# Patient Record
Sex: FEMALE | Race: BLACK OR AFRICAN AMERICAN | NOT HISPANIC OR LATINO | Employment: FULL TIME | URBAN - METROPOLITAN AREA
[De-identification: names, ages, dates, MRNs, and addresses within clinical notes are randomized per-mention and may not be internally consistent; named-entity substitution may affect disease eponyms.]

---

## 2023-09-20 ENCOUNTER — APPOINTMENT (OUTPATIENT)
Dept: PRIMARY CARE | Facility: CLINIC | Age: 58
End: 2023-09-20
Payer: COMMERCIAL

## 2023-09-20 NOTE — PROGRESS NOTES
Subjective   Patient ID: Meme Delgadillo is a 57 y.o. female h/o T2DM, ischemic stroke in 2022, obesity, HTN, tobacco use who presented with new onset RLE weakness and found to have acute L MCA infarct. who presents for No chief complaint on file..  HPI  Admitted to  recently from 8/31-9/6 for a perforated ulcer. She was at [facility] rehab after stroke, patient requested to be sent to  ED for low back pain and urinary retention  CT showed contained microperf of gastric antrum  Admitted, treated with IV abx, IV hydration, and bowel rest  Refused recommendation of SNF, discharged home with home care    Discharge Medications: Home Medication   metFORMIN 1000 mg oral tablet - 1 tab(s) orally 2 times a day   insulin lispro 100 units/mL subcutaneous solution - 1 dose(s) subcutaneous 4 times a day (before meals and at bedtime), per sliding scale   aspirin 81 mg oral tablet, chewable - 1 tab(s) orally once a day   losartan-hydrochlorothiazide 50 mg-12.5 mg oral tablet - 2 tab(s) orally once a day   clopidogrel 75 mg oral tablet - 1 tab(s) orally once a day   insulin glargine 100 units/mL subcutaneous solution - 35 unit(s) subcutaneous once a day (in the morning)   Trulicity Pen 0.75 mg/0.5 mL subcutaneous solution - 1 dose(s) subcutaneous once a week   amoxicillin-clavulanate 875 mg-125 mg oral tablet - 1 tab(s) orally 2 times a day   Walker with Wheels - 1 each   Height: 167.5cm, Weight 127 kg    pantoprazole 40 mg oral delayed release tablet - 1 tab(s) orally once a day        PRN Medication   hydrocortisone 1% rectal cream with applicator - 1 application rectal 2 times a day, As Needed   ammonium lactate 12% topical lotion - Apply topically to affected area 2 times a day, As Needed - for dry skin   acetaminophen 325 mg oral tablet - 2 tab(s) orally every 6 hours, As Needed - for mild pain       Objective   There were no vitals taken for this visit.   Physical Exam  General: Well appearing, conversational, in no acute  distress  HEENT: EOMI, PERRL, nares patent without congestion, MMM, TMs clear bilaterally   CV: RRR, no murmurs  Resp: Lungs CTAB, normal work of breathing  GI: Soft, nondistended, nontender, BS+   Ext: No lower ext swelling  Skin: Warm, dry, no rashes  Neuro: Awake, alert, oriented x3, moving all 4 extremities, nonfocal, normal gait, ambulates without assistance  Psych: Appropriate mood and affect      Assessment/Plan   Meme Delgadillo is a 57 y.o. female who presents for No chief complaint on file..  Problem List Items Addressed This Visit    None           Radha Cortez MD MPH

## 2023-09-26 ENCOUNTER — OFFICE VISIT (OUTPATIENT)
Dept: PRIMARY CARE | Facility: CLINIC | Age: 58
End: 2023-09-26
Payer: COMMERCIAL

## 2023-09-26 VITALS
SYSTOLIC BLOOD PRESSURE: 140 MMHG | OXYGEN SATURATION: 98 % | DIASTOLIC BLOOD PRESSURE: 86 MMHG | WEIGHT: 254 LBS | HEART RATE: 89 BPM

## 2023-09-26 DIAGNOSIS — E11.42 DIABETIC POLYNEUROPATHY ASSOCIATED WITH TYPE 2 DIABETES MELLITUS (MULTI): ICD-10-CM

## 2023-09-26 DIAGNOSIS — L85.3 DRY SKIN: Primary | ICD-10-CM

## 2023-09-26 DIAGNOSIS — I63.9 CEREBROVASCULAR ACCIDENT (CVA), UNSPECIFIED MECHANISM (MULTI): ICD-10-CM

## 2023-09-26 DIAGNOSIS — E11.8 DIABETES MELLITUS TYPE 2 WITH COMPLICATIONS (MULTI): ICD-10-CM

## 2023-09-26 DIAGNOSIS — I10 PRIMARY HYPERTENSION: ICD-10-CM

## 2023-09-26 PROCEDURE — 3079F DIAST BP 80-89 MM HG: CPT | Performed by: STUDENT IN AN ORGANIZED HEALTH CARE EDUCATION/TRAINING PROGRAM

## 2023-09-26 PROCEDURE — 1036F TOBACCO NON-USER: CPT | Performed by: STUDENT IN AN ORGANIZED HEALTH CARE EDUCATION/TRAINING PROGRAM

## 2023-09-26 PROCEDURE — 99204 OFFICE O/P NEW MOD 45 MIN: CPT | Performed by: STUDENT IN AN ORGANIZED HEALTH CARE EDUCATION/TRAINING PROGRAM

## 2023-09-26 PROCEDURE — 3077F SYST BP >= 140 MM HG: CPT | Performed by: STUDENT IN AN ORGANIZED HEALTH CARE EDUCATION/TRAINING PROGRAM

## 2023-09-26 RX ORDER — NAPROXEN SODIUM 220 MG/1
81 TABLET, FILM COATED ORAL
COMMUNITY
End: 2023-09-26 | Stop reason: SDUPTHER

## 2023-09-26 RX ORDER — PANTOPRAZOLE SODIUM 40 MG/1
40 TABLET, DELAYED RELEASE ORAL DAILY
Qty: 90 TABLET | Refills: 3 | Status: SHIPPED | OUTPATIENT
Start: 2023-09-26 | End: 2024-09-25

## 2023-09-26 RX ORDER — AMMONIUM LACTATE 12 G/100G
1 LOTION TOPICAL DAILY PRN
Qty: 225 G | Refills: 3 | Status: SHIPPED | OUTPATIENT
Start: 2023-09-26 | End: 2023-10-26

## 2023-09-26 RX ORDER — INSULIN GLARGINE 100 [IU]/ML
35 INJECTION, SOLUTION SUBCUTANEOUS EVERY MORNING
COMMUNITY
Start: 2023-08-30 | End: 2023-09-26 | Stop reason: SDUPTHER

## 2023-09-26 RX ORDER — INSULIN GLARGINE 100 [IU]/ML
35 INJECTION, SOLUTION SUBCUTANEOUS EVERY MORNING
Qty: 3 ML | Refills: 11 | Status: SHIPPED | OUTPATIENT
Start: 2023-09-26 | End: 2023-10-09

## 2023-09-26 RX ORDER — BLOOD-GLUCOSE SENSOR
1 EACH MISCELLANEOUS
Qty: 3 EACH | Refills: 3 | Status: SHIPPED | OUTPATIENT
Start: 2023-09-26 | End: 2023-10-26

## 2023-09-26 RX ORDER — PANTOPRAZOLE SODIUM 40 MG/1
40 TABLET, DELAYED RELEASE ORAL DAILY
COMMUNITY
Start: 2023-09-05 | End: 2023-09-26 | Stop reason: SDUPTHER

## 2023-09-26 RX ORDER — ATORVASTATIN CALCIUM 80 MG/1
80 TABLET, FILM COATED ORAL
COMMUNITY
End: 2023-09-26 | Stop reason: SDUPTHER

## 2023-09-26 RX ORDER — DULAGLUTIDE 0.75 MG/.5ML
0.75 INJECTION, SOLUTION SUBCUTANEOUS
Qty: 2 ML | Refills: 1 | Status: SHIPPED | OUTPATIENT
Start: 2023-09-26 | End: 2024-01-10 | Stop reason: ALTCHOICE

## 2023-09-26 RX ORDER — DULAGLUTIDE 0.75 MG/.5ML
0.75 INJECTION, SOLUTION SUBCUTANEOUS
COMMUNITY
Start: 2023-02-20 | End: 2023-09-26 | Stop reason: SDUPTHER

## 2023-09-26 RX ORDER — GABAPENTIN 300 MG/1
600 CAPSULE ORAL NIGHTLY
Qty: 60 CAPSULE | Refills: 0 | Status: SHIPPED | OUTPATIENT
Start: 2023-09-26 | End: 2024-01-10

## 2023-09-26 RX ORDER — AMMONIUM LACTATE 12 G/100G
1 LOTION TOPICAL DAILY PRN
COMMUNITY
Start: 2023-03-27 | End: 2023-09-26 | Stop reason: SDUPTHER

## 2023-09-26 RX ORDER — CLOPIDOGREL BISULFATE 75 MG/1
75 TABLET ORAL
Qty: 90 TABLET | Refills: 3 | Status: SHIPPED | OUTPATIENT
Start: 2023-09-26 | End: 2024-09-25

## 2023-09-26 RX ORDER — CLOPIDOGREL BISULFATE 75 MG/1
75 TABLET ORAL
COMMUNITY
End: 2023-09-26 | Stop reason: SDUPTHER

## 2023-09-26 RX ORDER — NAPROXEN SODIUM 220 MG/1
81 TABLET, FILM COATED ORAL
Qty: 90 TABLET | Refills: 3 | Status: SHIPPED | OUTPATIENT
Start: 2023-09-26 | End: 2024-09-25

## 2023-09-26 RX ORDER — LOSARTAN POTASSIUM AND HYDROCHLOROTHIAZIDE 25; 100 MG/1; MG/1
1 TABLET ORAL
Qty: 90 TABLET | Refills: 3 | Status: SHIPPED | OUTPATIENT
Start: 2023-09-26 | End: 2024-09-25

## 2023-09-26 RX ORDER — BLOOD-GLUCOSE CONTROL, NORMAL
1 EACH MISCELLANEOUS DAILY
Qty: 100 EACH | Refills: 3 | Status: SHIPPED | OUTPATIENT
Start: 2023-09-26

## 2023-09-26 RX ORDER — ATORVASTATIN CALCIUM 80 MG/1
80 TABLET, FILM COATED ORAL DAILY
Qty: 90 TABLET | Refills: 3 | Status: SHIPPED | OUTPATIENT
Start: 2023-09-26 | End: 2024-09-25

## 2023-09-26 RX ORDER — LOSARTAN POTASSIUM AND HYDROCHLOROTHIAZIDE 25; 100 MG/1; MG/1
1 TABLET ORAL
COMMUNITY
Start: 2023-02-20 | End: 2023-09-26 | Stop reason: SDUPTHER

## 2023-09-26 NOTE — PROGRESS NOTES
Subjective   Patient ID: Meme Delgadillo is a 57 y.o. female h/o T2DM, ischemic stroke in 2022, obesity, HTN, tobacco use who presented with new onset RLE weakness and found to have acute L MCA infarct in late August and then was subsequently readmitted with microperf of gastric antrum who presents to [facility].    HPI  Admitted to  recently from 8/31-9/6 for a perforated ulcer. She was at [facility] rehab after stroke, patient requested to be sent to  ED for low back pain and urinary retention  CT showed contained microperf of gastric antrum  Admitted, treated with IV abx, IV hydration, and bowel rest  Refused recommendation of SNF, discharged home with home care    Discharge Medications: Home Medication   metFORMIN 1000 mg oral tablet - 1 tab(s) orally 2 times a day   insulin lispro 100 units/mL subcutaneous solution - 1 dose(s) subcutaneous 4 times a day (before meals and at bedtime), per sliding scale   aspirin 81 mg oral tablet, chewable - 1 tab(s) orally once a day   losartan-hydrochlorothiazide 50 mg-12.5 mg oral tablet - 2 tab(s) orally once a day   clopidogrel 75 mg oral tablet - 1 tab(s) orally once a day   insulin glargine 100 units/mL subcutaneous solution - 35 unit(s) subcutaneous once a day (in the morning)   Trulicity Pen 0.75 mg/0.5 mL subcutaneous solution - 1 dose(s) subcutaneous once a week   amoxicillin-clavulanate 875 mg-125 mg oral tablet - 1 tab(s) orally 2 times a day   Walker with Wheels - 1 each   Height: 167.5cm, Weight 127 kg    pantoprazole 40 mg oral delayed release tablet - 1 tab(s) orally once a day        PRN Medication   hydrocortisone 1% rectal cream with applicator - 1 application rectal 2 times a day, As Needed   ammonium lactate 12% topical lotion - Apply topically to affected area 2 times a day, As Needed - for dry skin   acetaminophen 325 mg oral tablet - 2 tab(s) orally every 6 hours, As Needed - for mild pain     Since hospitalization:  -Walking  -R arm is not working as  well- in therapy  -Has not been taking her insulin since leaving the hospital, has not been taking much of her medication  -Neuropathy in her feet has been really bad     Chronic issues:  #CVA x2  -ASA 81, clopidogrel 75mg daily    #T2DM  -Trulicity 0.75-- has not taken yet  -Insulin glargine 35 units-- not taking recently   -Metformin 1000mg BID-- not taking, wants to change it due to diarrhea   -A1c 13.2 on 23  -Needs migue or glucometer (one touch verioflex)   -261 on last BG check  -Has neuropathy in her legs-- tried gabapentin     #HTN  -Losartan-hydrochlorothiazide 50-12.5-- 2 tablets daily    Health Maintenance:--- Deferred today     Social history:  -70 pack year smoking history (smoked age 11 to 57yo --quit in  when she had first stroke  -Drinks occasionally- recently quit    -Has custody of 4 grandkids, her kids and their great aunts and uncles help  -Lives with nedra as well  -Used to be a home health aid     Family history:  -DM runs in family   -Dad lived to be 99 yo--  due to COVID  -Mother lived to be 83,  soon after dad  -Both parents had arthritis    Objective   Visit Vitals  /86   Pulse 89   Wt 115 kg (254 lb)   SpO2 98%   Smoking Status Former      Physical Exam  General: Well appearing, conversational, in no acute distress  HEENT: EOMI, PERRL, nares patent without congestion, MMM, many teeth missing    CV: RRR, no murmurs  Resp: Lungs CTAB, normal work of breathing  GI: Soft, nondistended, nontender, BS+   Ext: Bilateral trace lower ext edema, RUE with trace edema  Skin: Warm, dry, no rashes, small callus on pad of R big toe, no lesions on L foot   Neuro: Awake, alert, oriented x3, moving all 4 extremities, able to walk slowly, difficult to get on exam table without holding on, RLE hip flexion 4+/5, otherwise normal strength, RUE forearm flexion 4+/5, otherwise normal strength, face symmetric with CN II-XII intact, occasional word finding difficulty, no dysarthria    Psych: Appropriate mood and, affect      Assessment/Plan   Meme Delgadillo is a 57 y.o. female h/o T2DM, ischemic stroke in 2022, obesity, HTN, tobacco use who presented with new onset RLE weakness and found to have acute L MCA infarct in late August and then was subsequently readmitted with microperf of gastric antrum who presents to [facility]. She has not been taking any of her medications since discharge and has had high blood sugars. She has not been taking her antiplatelet medication for recent stroke. She is fortunately well appearing and stable today. Due to inability to ambulate easily, will order home care for nursing and health aid to help with medication. Face to face today. She states she is already getting PT. Refilled all of her medications and made [facility] referral. Will also refer to neuro stroke for follow-up.     Will start gabapentin for neuropathy.    Plan to follow up in 2 weeks to ensure referrals have been scheduled, she is able to get her medications, and to see if gabapentin is helping with neuropathy.     Problem List Items Addressed This Visit    None  Visit Diagnoses       Dry skin    -  Primary    Relevant Medications    ammonium lactate (Lac-Hydrin) 12 % lotion    Cerebrovascular accident (CVA), unspecified mechanism (CMS/HCC)        Relevant Medications    aspirin 81 mg chewable tablet    atorvastatin (Lipitor) 80 mg tablet    clopidogrel (Plavix) 75 mg tablet    pantoprazole (ProtoNix) 40 mg EC tablet    Other Relevant Orders    Referral to Home Care    Referral to Neurology    Primary hypertension        Relevant Medications    losartan-hydrochlorothiazide (Hyzaar) 100-25 mg tablet    Diabetes mellitus type 2 with complications (CMS/HCC)        Relevant Medications    dulaglutide (Trulicity) 0.75 mg/0.5 mL pen injector    insulin glargine (Lantus) 100 unit/mL (3 mL) pen    blood sugar diagnostic strip    lancets 30 gauge misc    blood-glucose sensor (FreeStyle Carlee 3 Sensor) device     Other Relevant Orders    Referral to Home Care    Follow Up In [facility] - Pharmacy    Diabetic polyneuropathy associated with type 2 diabetes mellitus (CMS/[facility])        Relevant Medications    gabapentin (Neurontin) 300 mg capsule              Radha Cortez MD MPH

## 2023-09-26 NOTE — PATIENT INSTRUCTIONS
Thank you for coming today Meme!    Our pharmacist Ellyn or one of her team members will be calling you.     I have refilled all of your medications.    You should be hearing from home health for an aid.    Please make a neurology appointment with Dr. Mobley. [phone]    I will see you in 2 weeks!

## 2023-09-28 ENCOUNTER — HOME HEALTH ADMISSION (OUTPATIENT)
Dept: HOME HEALTH SERVICES | Facility: HOME HEALTH | Age: 58
End: 2023-09-28
Payer: COMMERCIAL

## 2023-10-07 PROBLEM — N30.80 EMPHYSEMATOUS CYSTITIS: Status: ACTIVE | Noted: 2017-02-26

## 2023-10-07 PROBLEM — I65.22 INTERNAL CAROTID ARTERY OCCLUSION, LEFT: Status: ACTIVE | Noted: 2022-09-07

## 2023-10-07 PROBLEM — G45.9 TIA (TRANSIENT ISCHEMIC ATTACK): Status: ACTIVE | Noted: 2022-09-04

## 2023-10-07 PROBLEM — F17.200 NICOTINE USE DISORDER: Status: ACTIVE | Noted: 2019-11-09

## 2023-10-07 PROBLEM — E66.9 OBESITY, CLASS II, BMI 35-39.9: Status: ACTIVE | Noted: 2023-10-07

## 2023-10-07 PROBLEM — R42 DIZZINESS: Status: ACTIVE | Noted: 2022-11-05

## 2023-10-07 PROBLEM — N20.0 RENAL CALCULI: Status: ACTIVE | Noted: 2021-01-04

## 2023-10-07 PROBLEM — E83.42 HYPOMAGNESEMIA: Status: ACTIVE | Noted: 2022-11-05

## 2023-10-07 PROBLEM — D64.9 NORMOCYTIC ANEMIA: Status: ACTIVE | Noted: 2017-03-01

## 2023-10-07 PROBLEM — E78.49 OTHER HYPERLIPIDEMIA: Status: ACTIVE | Noted: 2022-09-07

## 2023-10-07 PROBLEM — R29.90 STROKE-LIKE SYMPTOM: Status: ACTIVE | Noted: 2023-08-27

## 2023-10-07 PROBLEM — R20.0 FACIAL NUMBNESS: Status: ACTIVE | Noted: 2022-11-05

## 2023-10-07 PROBLEM — Z86.73 HISTORY OF ISCHEMIC STROKE: Status: ACTIVE | Noted: 2022-11-05

## 2023-10-07 PROBLEM — R29.898 RIGHT LEG WEAKNESS: Status: ACTIVE | Noted: 2023-08-27

## 2023-10-09 DIAGNOSIS — I63.9 CEREBROVASCULAR ACCIDENT (CVA), UNSPECIFIED MECHANISM (MULTI): ICD-10-CM

## 2023-10-09 DIAGNOSIS — E11.8 DIABETES MELLITUS TYPE 2 WITH COMPLICATIONS (MULTI): Primary | ICD-10-CM

## 2023-10-09 RX ORDER — INSULIN DEGLUDEC 200 U/ML
35 INJECTION, SOLUTION SUBCUTANEOUS NIGHTLY
Qty: 6 ML | Refills: 12 | Status: SHIPPED | OUTPATIENT
Start: 2023-10-09 | End: 2023-11-27 | Stop reason: SDUPTHER

## 2023-11-27 DIAGNOSIS — I63.9 CEREBROVASCULAR ACCIDENT (CVA), UNSPECIFIED MECHANISM (MULTI): ICD-10-CM

## 2023-11-27 DIAGNOSIS — E11.8 DIABETES MELLITUS TYPE 2 WITH COMPLICATIONS (MULTI): ICD-10-CM

## 2023-11-28 RX ORDER — INSULIN DEGLUDEC 200 U/ML
35 INJECTION, SOLUTION SUBCUTANEOUS NIGHTLY
Qty: 6 ML | Refills: 12 | Status: SHIPPED | OUTPATIENT
Start: 2023-11-28 | End: 2023-12-11

## 2023-12-11 ENCOUNTER — OFFICE VISIT (OUTPATIENT)
Dept: PRIMARY CARE | Facility: CLINIC | Age: 58
End: 2023-12-11
Payer: COMMERCIAL

## 2023-12-11 ENCOUNTER — LAB (OUTPATIENT)
Dept: LAB | Facility: LAB | Age: 58
End: 2023-12-11
Payer: COMMERCIAL

## 2023-12-11 VITALS — DIASTOLIC BLOOD PRESSURE: 80 MMHG | OXYGEN SATURATION: 98 % | SYSTOLIC BLOOD PRESSURE: 140 MMHG | HEART RATE: 88 BPM

## 2023-12-11 DIAGNOSIS — E11.8 DIABETES MELLITUS TYPE 2 WITH COMPLICATIONS (MULTI): ICD-10-CM

## 2023-12-11 DIAGNOSIS — E78.49 OTHER HYPERLIPIDEMIA: ICD-10-CM

## 2023-12-11 DIAGNOSIS — I63.9 ACUTE ISCHEMIC STROKE (MULTI): ICD-10-CM

## 2023-12-11 DIAGNOSIS — D64.9 ANEMIA, UNSPECIFIED TYPE: ICD-10-CM

## 2023-12-11 DIAGNOSIS — E11.8 DIABETES MELLITUS TYPE 2 WITH COMPLICATIONS (MULTI): Primary | ICD-10-CM

## 2023-12-11 LAB
ALBUMIN SERPL BCP-MCNC: 4.2 G/DL (ref 3.4–5)
ALP SERPL-CCNC: 79 U/L (ref 33–136)
ALT SERPL W P-5'-P-CCNC: 10 U/L (ref 7–45)
ANION GAP SERPL CALC-SCNC: 16 MMOL/L (ref 10–20)
AST SERPL W P-5'-P-CCNC: 10 U/L (ref 9–39)
BILIRUB SERPL-MCNC: 1.1 MG/DL (ref 0–1.2)
BUN SERPL-MCNC: 17 MG/DL (ref 6–23)
CALCIUM SERPL-MCNC: 10.1 MG/DL (ref 8.6–10.6)
CHLORIDE SERPL-SCNC: 96 MMOL/L (ref 98–107)
CHOLEST SERPL-MCNC: 158 MG/DL (ref 0–199)
CHOLESTEROL/HDL RATIO: 3.3
CO2 SERPL-SCNC: 27 MMOL/L (ref 21–32)
CREAT SERPL-MCNC: 0.99 MG/DL (ref 0.5–1.05)
ERYTHROCYTE [DISTWIDTH] IN BLOOD BY AUTOMATED COUNT: 12.7 % (ref 11.5–14.5)
EST. AVERAGE GLUCOSE BLD GHB EST-MCNC: 329 MG/DL
GFR SERPL CREATININE-BSD FRML MDRD: 65 ML/MIN/1.73M*2
GLUCOSE SERPL-MCNC: 418 MG/DL (ref 74–99)
HBA1C MFR BLD: 13.1 %
HCT VFR BLD AUTO: 38.5 % (ref 36–46)
HDLC SERPL-MCNC: 48.6 MG/DL
HGB BLD-MCNC: 13 G/DL (ref 12–16)
LDLC SERPL CALC-MCNC: 83 MG/DL
MCH RBC QN AUTO: 28.1 PG (ref 26–34)
MCHC RBC AUTO-ENTMCNC: 33.8 G/DL (ref 32–36)
MCV RBC AUTO: 83 FL (ref 80–100)
NON HDL CHOLESTEROL: 109 MG/DL (ref 0–149)
NRBC BLD-RTO: 0 /100 WBCS (ref 0–0)
PLATELET # BLD AUTO: 342 X10*3/UL (ref 150–450)
POTASSIUM SERPL-SCNC: 3.8 MMOL/L (ref 3.5–5.3)
PROT SERPL-MCNC: 8 G/DL (ref 6.4–8.2)
RBC # BLD AUTO: 4.62 X10*6/UL (ref 4–5.2)
SODIUM SERPL-SCNC: 135 MMOL/L (ref 136–145)
TRIGL SERPL-MCNC: 134 MG/DL (ref 0–149)
VLDL: 27 MG/DL (ref 0–40)
WBC # BLD AUTO: 10.4 X10*3/UL (ref 4.4–11.3)

## 2023-12-11 PROCEDURE — 80061 LIPID PANEL: CPT

## 2023-12-11 PROCEDURE — 3079F DIAST BP 80-89 MM HG: CPT | Performed by: STUDENT IN AN ORGANIZED HEALTH CARE EDUCATION/TRAINING PROGRAM

## 2023-12-11 PROCEDURE — 36415 COLL VENOUS BLD VENIPUNCTURE: CPT

## 2023-12-11 PROCEDURE — 3046F HEMOGLOBIN A1C LEVEL >9.0%: CPT | Performed by: STUDENT IN AN ORGANIZED HEALTH CARE EDUCATION/TRAINING PROGRAM

## 2023-12-11 PROCEDURE — 3048F LDL-C <100 MG/DL: CPT | Performed by: STUDENT IN AN ORGANIZED HEALTH CARE EDUCATION/TRAINING PROGRAM

## 2023-12-11 PROCEDURE — 99213 OFFICE O/P EST LOW 20 MIN: CPT | Performed by: STUDENT IN AN ORGANIZED HEALTH CARE EDUCATION/TRAINING PROGRAM

## 2023-12-11 PROCEDURE — 1036F TOBACCO NON-USER: CPT | Performed by: STUDENT IN AN ORGANIZED HEALTH CARE EDUCATION/TRAINING PROGRAM

## 2023-12-11 PROCEDURE — 3077F SYST BP >= 140 MM HG: CPT | Performed by: STUDENT IN AN ORGANIZED HEALTH CARE EDUCATION/TRAINING PROGRAM

## 2023-12-11 PROCEDURE — 85027 COMPLETE CBC AUTOMATED: CPT

## 2023-12-11 PROCEDURE — 83036 HEMOGLOBIN GLYCOSYLATED A1C: CPT

## 2023-12-11 PROCEDURE — 80053 COMPREHEN METABOLIC PANEL: CPT

## 2023-12-11 RX ORDER — INSULIN GLARGINE 100 [IU]/ML
30 INJECTION, SOLUTION SUBCUTANEOUS EVERY 24 HOURS
Qty: 10 ML | Refills: 11 | Status: SHIPPED | OUTPATIENT
Start: 2023-12-11 | End: 2023-12-11

## 2023-12-11 RX ORDER — INSULIN GLARGINE 100 [IU]/ML
30 INJECTION, SOLUTION SUBCUTANEOUS NIGHTLY
Qty: 9 ML | Refills: 11 | Status: SHIPPED | OUTPATIENT
Start: 2023-12-11 | End: 2024-12-05

## 2023-12-11 NOTE — PROGRESS NOTES
Subjective   Patient ID: Meme Delgadillo is a 58 y.o. female h/o T2DM, ischemic stroke in , obesity, HTN, tobacco use who presented with new onset RLE weakness and found to have acute L MCA infarct in late August and then was subsequently readmitted with microperf of gastric antrum who presents for Dizziness and Numbness.    Patient states that she has lightheadedness when she stands up.  Every time she bends down and comes back up, she feels like she is about to pass out and gets a sensation of numbness from her head to her toes.  She also gets occasional sharp shooting pains in her head.  She leans forward and feels like her sinuses are full and that her head is about to blow off.  Her sinuses are causing postnasal drip.    Having frequent urination. No dysuria.    Patient states that she has not been taking any insulin and has been missing a lot of her medications.  She states that she ran out and that the pharmacy had not filled it.  Her blood sugar was 400 yesterday. She has not answered the pharmacist phone calls from our office and has missed multiple appointments.      Chronic issues:  #CVA x2  -ASA 81, clopidogrel 75mg daily    #T2DM  -Trulicity 0.75-- has not taken yet  -Insulin glargine 30 units-- not taking recently   -Metformin 1000mg BID-- not taking, wants to change it due to diarrhea   -A1c 13.2 on 23  -Needs migue or glucometer (one touch verioflex)   -Has neuropathy in her legs-- tried gabapentin     #HTN  -Losartan-hydrochlorothiazide 50-12.5-- 2 tablets daily    Health Maintenance:--- Deferred today     Social history:  -70 pack year smoking history (smoked age 11 to 55yo --quit in  when she had first stroke  -Drinks occasionally- recently quit    -Has custody of 4 grandkids, her kids and their great aunts and uncles help  -Lives with nedra as well  -Used to be a home health aid     Family history:  -DM runs in family   -Dad lived to be 99 yo--  due to COVID  -Mother lived to be  83,  soon after dad  -Both parents had arthritis    Objective   Visit Vitals  /80   Pulse 88   SpO2 98%   Smoking Status Former      Physical Exam  General: Well appearing, conversational, in no acute distress  HEENT: EOMI, PERRL, nares patent without congestion, MMM, many teeth missing    CV: RRR, no murmurs  Resp: Lungs CTAB, normal work of breathing  GI: Soft, nondistended, nontender, BS+   Ext: Bilateral trace lower ext edema, RUE with trace edema  Skin: Warm, dry, no rashes, small callus on pad of R big toe, no lesions on L foot   Neuro: Awake, alert, oriented x3, moving all 4 extremities, able to walk slowly, difficult to get on exam table without holding on, RLE hip flexion 4+/5, otherwise normal strength, RUE forearm flexion 4+/5, otherwise normal strength, face symmetric with CN II-XII intact, occasional word finding difficulty, no dysarthria   Psych: Appropriate mood and, affect      Orthostatic vitals done in the office and reassuring     Assessment/Plan   Meme Delgadillo is a 58 y.o. female h/o T2DM, ischemic stroke in , obesity, HTN, tobacco use who presented with new onset RLE weakness and found to have acute L MCA infarct in late August and then was subsequently readmitted with microperf of gastric antrum who presents for Dizziness and Numbness.  Her orthostatic vitals are negative.  However, I am concerned that she is dehydrated from frequent urination from uncontrolled blood sugar.  Discussed the importance of taking her medications and recent medications to her pharmacy including insulin and urged her to use her insulin tonight.  Prescribed 30 units nightly.  She wants to start ozempic, however I am concerned she could be dehydrated and want to wait until labs are back. Also reinitiated a pharmacy consult and encouraged her to answer the phone if they called.  Referred to neurology for strokes.  Will get blood work today. Plan for follow up in 2 weeks.        Problem List Items Addressed  This Visit       Acute ischemic stroke (CMS/[facility])    Relevant Orders    Referral to Neurology    Other hyperlipidemia    Relevant Orders    Lipid Panel (Completed)     Other Visit Diagnoses       Diabetes mellitus type 2 with complications (CMS/[facility])    -  Primary    Relevant Medications    insulin glargine (Lantus) 100 unit/mL (3 mL) pen    Other Relevant Orders    Follow Up In [facility] - Pharmacy    Hemoglobin A1C (Completed)    Referral to Podiatry    POCT Fingerstick Glucose manually resulted    Comprehensive Metabolic Panel (Completed)    Anemia, unspecified type        Relevant Orders    CBC (Completed)            Radha Cortez MD MPH

## 2023-12-11 NOTE — PATIENT INSTRUCTIONS
Thank you for coming today Meme!    I am very worried about your blood sugar. I have sent a prescription for glargine to the pharmacy if you can pick that up today.     Please take your plavix, aspirin, and atorvastatin everyday for your stroke.    Please get your blood work done today. The lab is on floor [address] in [address].     Once we have the results of your blood work, we can decide on ozempic.    Please follow up in 2 weeks-- I have made you an appointment for 12/27 at 11:40 am.    Please schedule your neurology appointment.

## 2023-12-14 ENCOUNTER — HOSPITAL ENCOUNTER (EMERGENCY)
Facility: HOSPITAL | Age: 58
Discharge: HOME | End: 2023-12-14
Payer: COMMERCIAL

## 2023-12-14 PROCEDURE — 4500999001 HC ED NO CHARGE

## 2023-12-27 ENCOUNTER — OFFICE VISIT (OUTPATIENT)
Dept: PRIMARY CARE | Facility: CLINIC | Age: 58
End: 2023-12-27
Payer: COMMERCIAL

## 2023-12-27 ENCOUNTER — HOME HEALTH ADMISSION (OUTPATIENT)
Dept: HOME HEALTH SERVICES | Facility: HOME HEALTH | Age: 58
End: 2023-12-27

## 2023-12-27 ENCOUNTER — DOCUMENTATION (OUTPATIENT)
Dept: HOME HEALTH SERVICES | Facility: HOME HEALTH | Age: 58
End: 2023-12-27

## 2023-12-27 VITALS
TEMPERATURE: 97.7 F | OXYGEN SATURATION: 98 % | DIASTOLIC BLOOD PRESSURE: 78 MMHG | SYSTOLIC BLOOD PRESSURE: 100 MMHG | HEART RATE: 100 BPM

## 2023-12-27 DIAGNOSIS — E11.59 TYPE 2 DIABETES MELLITUS WITH OTHER CIRCULATORY COMPLICATION, WITH LONG-TERM CURRENT USE OF INSULIN (MULTI): Primary | ICD-10-CM

## 2023-12-27 DIAGNOSIS — R09.89 POOR ARTERIAL PERFUSION OF LOWER EXTREMITY: ICD-10-CM

## 2023-12-27 DIAGNOSIS — Z79.4 TYPE 2 DIABETES MELLITUS WITH OTHER CIRCULATORY COMPLICATION, WITH LONG-TERM CURRENT USE OF INSULIN (MULTI): Primary | ICD-10-CM

## 2023-12-27 DIAGNOSIS — E11.8 DIABETES MELLITUS TYPE 2 WITH COMPLICATIONS (MULTI): ICD-10-CM

## 2023-12-27 PROCEDURE — 3046F HEMOGLOBIN A1C LEVEL >9.0%: CPT | Performed by: STUDENT IN AN ORGANIZED HEALTH CARE EDUCATION/TRAINING PROGRAM

## 2023-12-27 PROCEDURE — 3074F SYST BP LT 130 MM HG: CPT | Performed by: STUDENT IN AN ORGANIZED HEALTH CARE EDUCATION/TRAINING PROGRAM

## 2023-12-27 PROCEDURE — 99213 OFFICE O/P EST LOW 20 MIN: CPT | Performed by: STUDENT IN AN ORGANIZED HEALTH CARE EDUCATION/TRAINING PROGRAM

## 2023-12-27 PROCEDURE — 3048F LDL-C <100 MG/DL: CPT | Performed by: STUDENT IN AN ORGANIZED HEALTH CARE EDUCATION/TRAINING PROGRAM

## 2023-12-27 PROCEDURE — 3078F DIAST BP <80 MM HG: CPT | Performed by: STUDENT IN AN ORGANIZED HEALTH CARE EDUCATION/TRAINING PROGRAM

## 2023-12-27 PROCEDURE — 1036F TOBACCO NON-USER: CPT | Performed by: STUDENT IN AN ORGANIZED HEALTH CARE EDUCATION/TRAINING PROGRAM

## 2023-12-27 NOTE — PATIENT INSTRUCTIONS
Thank you for coming today Meme!    Please check your blood sugar and then take your medications in the morning. Please take insulin in the morning to help you take it more regularly.     If your blood glucose is over 500, go to the emergency room.     Please schedule your vascular ultrasound to look at the blood vessels in your feet.    Your blood pressure is low today. Please hold losartan-hydrochlorothiazide until you feel better.     The pharmacy team and the home care team will reach out to you.

## 2023-12-27 NOTE — PROGRESS NOTES
Subjective   Patient ID: Meme Delgadillo is a 58 y.o. female presenting in FU.     HPI    58 y.o. female h/o T2DM, ischemic stroke in 2022, obesity, HTN, tobacco use who presents in FU.     Last seen on 12/11 to establish care, at which time it was noted that she had severely uncontrolled DM symptoms such as polyuria, dehydration, orthostatic hypotension. Patient was noted to NOT be taking her prescribed insulin. Initiated pharm consult, encouraged adherence to insulin, and follow up in two weeks.     In last two weeks, patient states that she still is not regularly taking her insulin. States that she remembers taking it once a week approximately. Is able to remember better when her partner is around to administer the meds. Has a pill box. States that issue is not due to side effects, but rather just that she forgets to take her meds. Continues to endorse polyuria, malodorous urine, dehydration.     Also noted to have blue discoloration of her toes bilaterally, both feel numb and cold.     Review of Systems  + phlegm, recent sick contact, improving    12-point ROS completed and negative other than noted above      Objective     Vitals:    12/27/23 1151   BP: 100/78   Pulse: 100   Temp: 36.5 °C (97.7 °F)   SpO2: 98%        Physical Exam  Constitutional:       General: She is not in acute distress.     Appearance: Normal appearance. She is obese.   HENT:      Head: Normocephalic and atraumatic.      Nose: Nose normal.      Mouth/Throat:      Mouth: Mucous membranes are dry.   Eyes:      General: No scleral icterus.     Extraocular Movements: Extraocular movements intact.   Cardiovascular:      Rate and Rhythm: Normal rate and regular rhythm.      Heart sounds: Normal heart sounds. No murmur heard.     No friction rub. No gallop.   Pulmonary:      Effort: Pulmonary effort is normal. No respiratory distress.      Breath sounds: Normal breath sounds. No wheezing or rales.   Chest:      Chest wall: No tenderness.    Abdominal:      General: There is no distension.      Palpations: Abdomen is soft.      Tenderness: There is no abdominal tenderness.   Musculoskeletal:         General: Normal range of motion.      Cervical back: Normal range of motion and neck supple.      Right lower leg: No edema.      Left lower leg: No edema.   Skin:     General: Skin is warm and dry.      Comments: Bilateral toes with blue discoloration, 1+ DP palpated   Neurological:      Mental Status: She is alert and oriented to person, place, and time.   Psychiatric:         Mood and Affect: Mood normal.         Behavior: Behavior normal.           Current Outpatient Medications:     aspirin 81 mg chewable tablet, Chew 1 tablet (81 mg) once daily., Disp: 90 tablet, Rfl: 3    atorvastatin (Lipitor) 80 mg tablet, Take 1 tablet (80 mg) by mouth once daily., Disp: 90 tablet, Rfl: 3    clopidogrel (Plavix) 75 mg tablet, Take 1 tablet (75 mg) by mouth once daily., Disp: 90 tablet, Rfl: 3    dulaglutide (Trulicity) 0.75 mg/0.5 mL pen injector, Inject 0.75 mg under the skin every 7 days., Disp: 2 mL, Rfl: 1    gabapentin (Neurontin) 300 mg capsule, Take 2 capsules (600 mg) by mouth once daily at bedtime., Disp: 60 capsule, Rfl: 0    insulin glargine (Lantus) 100 unit/mL (3 mL) pen, Inject 30 Units under the skin once daily at bedtime. Take as directed per insulin instructions., Disp: 9 mL, Rfl: 11    lancets 30 gauge misc, 1 each once daily., Disp: 100 each, Rfl: 3    losartan-hydrochlorothiazide (Hyzaar) 100-25 mg tablet, Take 1 tablet by mouth once daily., Disp: 90 tablet, Rfl: 3    pantoprazole (ProtoNix) 40 mg EC tablet, Take 1 tablet (40 mg) by mouth once daily., Disp: 90 tablet, Rfl: 3       Assessment/Plan     58 y.o. female h/o T2DM, ischemic stroke in 2022, obesity, HTN, tobacco use who presents in FU.     #Uncontrolled T2DM  #Medication Adherence  - Patient still not consistently taking her insulin, states that she only remembers to take her insulin  approximately one time weekly  - Denies issues with understanding dosing or administration, denies side effects to meds  - Appears that patient requires significant assistance with meds, possible cognition and memory issues are causing this, can consider more formal testing down the line.   - Referral for home care for medication assistance placed  - Pharmacy referral placed for medication assistance  - Encouraged patient to present to ED if significant orthostasis, falls, or if sugars extremely elevated  - Instructed patient that she can take her insulin in the morning if this will help her with adherence   - FUV within 1 month to ensure compliance    #CVA  - Encouraged compliance with ASA, Plavix (pt inconsistently taking these as well)    #Possible PAD  #Neuropathy  - toes noted to be cyanotic on exam  - ABIs ordered, pending results could consider vasc surg referral   - podiatry referral    #HTN   - lower BP in office today, patient states she is taking anti-hypertensive intermittently. Possibly lower 2/2 dehydration  - obtain orthostats        Jose Conner MD   Internal Medicine PGY-2

## 2023-12-27 NOTE — HH CARE COORDINATION
Home Care received a referral for Nursing and Home Health Aide. Unfortunately, we are unable to accept and process the referral at this time.    Patients, please reach out to the referring provider or your PCP to assist in obtaining an alternative home care agency and/or guidance to meet your needs.    Providers, please reach out to [facility] with any questions regarding the declined referral.

## 2024-01-11 ENCOUNTER — TELEMEDICINE (OUTPATIENT)
Dept: NEUROLOGY | Facility: CLINIC | Age: 59
End: 2024-01-11
Payer: COMMERCIAL

## 2024-01-11 DIAGNOSIS — Z79.4 TYPE 2 DIABETES MELLITUS WITH DIABETIC POLYNEUROPATHY, WITH LONG-TERM CURRENT USE OF INSULIN (MULTI): ICD-10-CM

## 2024-01-11 DIAGNOSIS — E78.5 DYSLIPIDEMIA: ICD-10-CM

## 2024-01-11 DIAGNOSIS — I63.9 ACUTE ISCHEMIC STROKE (MULTI): ICD-10-CM

## 2024-01-11 DIAGNOSIS — I65.21 STENOSIS OF RIGHT CAROTID ARTERY: ICD-10-CM

## 2024-01-11 DIAGNOSIS — I63.032 CEREBROVASCULAR ACCIDENT (CVA) DUE TO THROMBOSIS OF LEFT CAROTID ARTERY (MULTI): Primary | ICD-10-CM

## 2024-01-11 DIAGNOSIS — E11.42 TYPE 2 DIABETES MELLITUS WITH DIABETIC POLYNEUROPATHY, WITH LONG-TERM CURRENT USE OF INSULIN (MULTI): ICD-10-CM

## 2024-01-11 DIAGNOSIS — I69.351 SPASTIC HEMIPLEGIA OF RIGHT DOMINANT SIDE AS LATE EFFECT OF CEREBRAL INFARCTION (MULTI): ICD-10-CM

## 2024-01-11 PROCEDURE — 99215 OFFICE O/P EST HI 40 MIN: CPT | Mod: GT | Performed by: PSYCHIATRY & NEUROLOGY

## 2024-01-11 PROCEDURE — 99205 OFFICE O/P NEW HI 60 MIN: CPT | Performed by: PSYCHIATRY & NEUROLOGY

## 2024-01-11 ASSESSMENT — ACTIVITIES OF DAILY LIVING (ADL)
TOTAL_SCORE: 85
BED_TO_CHAIR_AND_BACK: INDEPENDENT
GROOMING: NEEDS TO HELP WITH PERSONAL CARE
FEEDING: INDEPENDENT
TOILET_USE: INDEPENDENT (ON AND OFF, DRESSING, WIPING)
DRESSING: NEEDS HELP BUT CAN DO ABOUT HALF UNAIDED
BOWELS: INDEPENDENT (INCLUDING BUTTONS, ZIPS, LACES, ETC.)
BATHING: DEPENDENT
BLADDER: CONTINENT
STAIRS: INDEPENDENT
MOBILITY_LEVEL_SURFACES: INDEPENDENT (BUT MAY USE ANY AID FOR EXAMPLE, STICK) > 50 YARDS

## 2024-01-11 NOTE — PROGRESS NOTES
[facility]   Meme Delgadillo is a 58 y.o. year old female presenting for neurologic evaluation. Referred by PCP Radha Cortez MD MPH    1/11/24- Consult for stroke.    L hemisphere ischemic stroke - presented with R hemisensorimotor deficit in 9/2022, worsening weakness for 2 days in 8/2023, both with borderzone infarcts in setting of LICA occlusion and poor LMCA distal flow.  Left rehab due to depression.     Medication noncompliance- barriers- doesnt want to take pills or injections, can forget medications, is busy watching 4 grandchildren. Knows she has to do something different- is frustrated with poor sugar control, has frequent urination, poor sleep, tired during the day.   Residual R sided weakness, does ADLs, but housework difficult, muscles in legs cramp with activity, has PVRs ordered.      Vascular risk factors- HTN- well controlled, HPL- controlled, DM- uncontrolled A1c >13%, BMI >40, quit smoking after >40yr in 9/2022, +FH DM    Extensive review of notes in EMR, labs, tests, Interpretation of neuroimaging  9/2022- CT/ MRI/MRA, angio- multifocal atherosclerotic changes, LICA occlusion at bifurcation, MYRANDA 50% stenosis, RMCA moderate stenosis  11/2022- MRI no acute findings  8/2023- CT/ CTA/ -LICA occlusion, MYRANDA 60% stenosis, VA origin moderate stenosis, RMCA moderate narrowing, scattered acute LMCA infarcts with residual of prior borderzone infarcts. TTE/ ROHIT- no thrombus,     Relevant ROS, Problem list, Past Medical/ Surgical/ Family/ Social history- reviewed and pertinent details noted in history.     Objective     Visit Vitals  Smoking Status Former       Neuro Scores/ Scales:   Modified Greenlee (mRS) Modified Greenlee Score: Slight disability.  Able to look after own affairs without assistance, but unable to carry out all previous activities.   Barthel Index  Feeding: independent  Bathing: dependent  Grooming: needs to help with personal care  Dressing : needs  help but can do about half unaided  Bowels: independent (including buttons, zips, laces, etc.)  Bladder: continent  Toilet Use : independent (on and off, dressing, wiping)  Transfers (Bed to chair and back) : independent  Mobility (On level surfaces): independent (but may use any aid for example, stick) > 50 yards  Stairs: independent  Total (0-100): 85     Assessment/Plan   1. Cerebrovascular accident (CVA) due to thrombosis of left carotid artery (CMS/HCC)    2. Acute ischemic stroke (CMS/HCC)    3. Type 2 diabetes mellitus with diabetic polyneuropathy, with long-term current use of insulin (CMS/[facility])    4. Dyslipidemia    5. Spastic hemiplegia of right dominant side as late effect of cerebral infarction (CMS/HCC)    6. Stenosis of right carotid artery      PLAN   Continue aspirin with clopidogrel- indefinitely given severe disease, recurrent events. Given LICAO, monitor MYRANDA with US every 1-2yr as progressive disease would warrant closer monitoring/ potential revascularization.   Discussed her barriers to optimizing prevention, specifically diabetes.  Offered referral to [facility] for their advice and support.   Symptomatic PAD- discussed pending PVRs and importance of exercise.  Discussed referral to PT for hemiparesis- she'll consider.     Goals for STROKE PREVENTION- recommendations to reduce the risk of vascular events and promote brain health include monitoring and management of vascular risk factors and lifestyle choices to goal values.     Blood pressure- Normal BP is <120/80 mmHg.  Blood Pressure : Goal is less than 130/80 mmHg  Cholesterol- Ideal lipid profile is an LDL-cholesterol <70 mg/dl, total cholesterol <200 mg/dl, fasting triglycerides < 150 mg/dl and HDL-cholesterol >55 mg/dl.   Blood sugar- Blood Sugar : Goal is fasting sugar  mg/dl, after eating less than 180 mg/dl; HbA1c less than 7%  Healthy physical activity- Goal is a moderate level of exercise at least 30 minutes/day, most days of the  "week.   Healthy weight- Goal is an ideal weight with a waistline <40\" for men or <35\" for women, and BMI of 18.5-25.   Healthy diet- is rich in vegetables, fruits, whole grains, legumes and fish, low in salt, and avoids red meats and processed/ refined foods.   Healthy sleep- is restorative, ~7 hours/night, with identification and treatment of obstructive/ central sleep apnea that increases the risk of stroke and heart disease.   Smoking- Goal is to stop smoking any tobacco product and avoid second-hand smoke.   Alcohol- Goal is moderation; no more than 2 servings for men and 1 serving for non-pregnant women.   Drug use- Goal is avoidance of illicit drugs that can cause blood pressure spikes and damage to blood vessels.   Stroke Warning Signs- know the symptoms of stroke and the importance of calling 911/EMS to access the quickest treatment.     No orders of the defined types were placed in this encounter.                        "

## 2024-04-02 DIAGNOSIS — E11.42 TYPE 2 DIABETES MELLITUS WITH DIABETIC POLYNEUROPATHY, WITH LONG-TERM CURRENT USE OF INSULIN (MULTI): Primary | ICD-10-CM

## 2024-04-02 DIAGNOSIS — Z79.4 TYPE 2 DIABETES MELLITUS WITH DIABETIC POLYNEUROPATHY, WITH LONG-TERM CURRENT USE OF INSULIN (MULTI): Primary | ICD-10-CM

## 2024-04-12 ENCOUNTER — APPOINTMENT (OUTPATIENT)
Dept: NEUROLOGY | Facility: CLINIC | Age: 59
End: 2024-04-12
Payer: COMMERCIAL

## 2024-07-09 PROBLEM — G81.91 RIGHT HEMIPARESIS (MULTI): Status: ACTIVE | Noted: 2023-09-06

## 2024-07-09 PROBLEM — E11.65 UNCONTROLLED TYPE 2 DIABETES MELLITUS WITH HYPERGLYCEMIA (MULTI): Status: ACTIVE | Noted: 2024-07-01

## 2024-07-15 ENCOUNTER — APPOINTMENT (OUTPATIENT)
Dept: PRIMARY CARE | Facility: CLINIC | Age: 59
End: 2024-07-15
Payer: COMMERCIAL

## 2024-07-15 VITALS
HEART RATE: 109 BPM | DIASTOLIC BLOOD PRESSURE: 70 MMHG | SYSTOLIC BLOOD PRESSURE: 122 MMHG | OXYGEN SATURATION: 98 % | WEIGHT: 243 LBS

## 2024-07-15 DIAGNOSIS — R60.9 EDEMA, UNSPECIFIED TYPE: ICD-10-CM

## 2024-07-15 DIAGNOSIS — Z79.4 TYPE 2 DIABETES MELLITUS WITH OTHER CIRCULATORY COMPLICATION, WITH LONG-TERM CURRENT USE OF INSULIN (MULTI): Primary | ICD-10-CM

## 2024-07-15 DIAGNOSIS — Z12.11 COLON CANCER SCREENING: ICD-10-CM

## 2024-07-15 DIAGNOSIS — Z59.41 FOOD INSECURITY: ICD-10-CM

## 2024-07-15 DIAGNOSIS — E11.59 TYPE 2 DIABETES MELLITUS WITH OTHER CIRCULATORY COMPLICATION, WITH LONG-TERM CURRENT USE OF INSULIN (MULTI): Primary | ICD-10-CM

## 2024-07-15 DIAGNOSIS — Z12.31 ENCOUNTER FOR SCREENING MAMMOGRAM FOR MALIGNANT NEOPLASM OF BREAST: ICD-10-CM

## 2024-07-15 DIAGNOSIS — I10 PRIMARY HYPERTENSION: ICD-10-CM

## 2024-07-15 PROCEDURE — 99214 OFFICE O/P EST MOD 30 MIN: CPT | Performed by: STUDENT IN AN ORGANIZED HEALTH CARE EDUCATION/TRAINING PROGRAM

## 2024-07-15 PROCEDURE — 3078F DIAST BP <80 MM HG: CPT | Performed by: STUDENT IN AN ORGANIZED HEALTH CARE EDUCATION/TRAINING PROGRAM

## 2024-07-15 PROCEDURE — 3074F SYST BP LT 130 MM HG: CPT | Performed by: STUDENT IN AN ORGANIZED HEALTH CARE EDUCATION/TRAINING PROGRAM

## 2024-07-15 RX ORDER — INSULIN LISPRO 100 [IU]/ML
8 INJECTION, SOLUTION INTRAVENOUS; SUBCUTANEOUS
Qty: 22 ML | Refills: 3 | Status: SHIPPED | OUTPATIENT
Start: 2024-07-15 | End: 2025-07-15

## 2024-07-15 RX ORDER — HYDROCHLOROTHIAZIDE 12.5 MG/1
12.5 TABLET ORAL DAILY
Qty: 30 TABLET | Refills: 0 | Status: SHIPPED | OUTPATIENT
Start: 2024-07-15 | End: 2024-08-14

## 2024-07-15 SDOH — ECONOMIC STABILITY - FOOD INSECURITY: FOOD INSECURITY: Z59.41

## 2024-07-15 NOTE — PROGRESS NOTES
Subjective   Patient ID: Meme Delgadillo is a 58 y.o. female with PMH of T2DM, recurrent L MCA strokes in the s/o chronic L ICA occlusion, obesity, HTN, HLD, and recent hospitalization for R vertebral artery severe stenosis related R cerebellar infarct in  and c/f L MCA infarction in May presenting for post hospital follow up.     Had two stoke in May and . During her most recent stroke, she reports having a change in taste, felt dizzy and was unable to walk straight. Now she reports that she sill feels wobbly while walking but is able to walk without any assistance. Denies any falls. Since her stroke, she is having increased R sided weakness. Also having more R upper extremity numbness and swelling than before.     She would like to try ozmepic for weight loss and DM management. Thinks she lost weight, thought she used to weigh 260 lbs. Today, she weighed 240lbs. States she is not exercising and diet is not healthy. Has a difficult time with affording healthier foods. For her insulin she takes 14 units of glargine in the morning and 8 units with meals. Does check her blood sugars multiple times a day at home and numbers ranging around 200s.     She is not taking losartan and hydrochlorothiazide since her hospitalization. She reports having headaches and occasionally reports blurry vision. Does not have prescribe eye glasses. Has not seen an eye doctor in many years.    Denies any chest pain, SOB, abdominal pain or nausea/vomiting.      Social history:  -70 pack year smoking history (smoked age 11 to 57yo --quit in  when she had first stroke  -Drinks occasionally, overall drinking minimally   -Has custody of 4 grandkids   -Lives with nedra as well  -Used to be a home health aid     Family history:  -DM runs in family   -Dad lived to be 99 yo--  due to COVID  -Mother lived to be 83,  soon after dad  -Both parents had arthritis    Objective   Visit Vitals  /70   Pulse 109   Wt 110 kg (243 lb)    SpO2 98%   Smoking Status Former      Physical Exam  General: Well appearing, conversational, in no acute distress  HEENT: EOMI, PERRL, MMM, many teeth missing    CV: RRR, no murmurs  Resp: Lungs CTAB, normal work of breathing  GI: Soft, nondistended, nontender, BS+   Ext: Bilateral trace lower ext edema, RUE with trace edema  Skin: Warm, dry, no rashes, small callus on pad of R big toe  Neuro: Awake, alert, oriented x3, moving all 4 extremities, RUE forearm flexion 4+/5, RLE knee flexion 4+/5, otherwise normal strength, no dysarthria   Psych: Appropriate mood and, affect      Assessment/Plan   Meme Delgadillo is a 58 y.o. female h/o T2DM, recurrent L MCA strokes in the s/o chronic L ICA occlusion, obesity, HTN, HLD, and recent hospitalization for R vertebral artery severe stenosis related R cerebellar infarct in June and c/f L MCA infarction in May presenting for post hospital follow up. Overall reports she is feeling better and taking all of her medications as prescribed.     #CVA x4  ::two recent hospitalizations for CVA at [facility]   -On ASA 81, clopidogrel 75mg daily  -Reports taking daily   -Refer to PT/OT for R sided weakness, has an apt scheduled for 7/24 at [facility]     #T2DM  #Obesity   -Insulin glargine 14 units in the morning and 8 units TID  -A1c 12.8 on 6/26/24  -Will trial ozempic   -Will also reach out to advanced care pharmacy     #HTN  -Not taking Losartan-hydrochlorothiazide since her most recent hospitalization   -/70   -Will start hydrochlorothiazide at 12.5 mg to help with swelling   -Will monitor and consider reintroducing additional medications as needed     #Blurry Vision   -Refer to optometry for eye exam      #Food insecurity    -Refer to [facility]     Health Maintenance   -Not uptodate on mammogram or PAP, order mammogram and will discuss PAP smear at next visit   -Not up to date on PNA or shingles, unsure about getting vaccinated, will discuss at next visit     -Never had a colonoscopy,  will refer her today   -Will need to refer for dental exam next visit     RTC in one month for follow up     Problem List Items Addressed This Visit       Diabetes mellitus (Multi) - Primary    Relevant Medications    semaglutide 0.25 mg or 0.5 mg (2 mg/3 mL) pen injector (Start on 7/21/2024)    Other Relevant Orders    Referral to Ophthalmology    Referral to [facility]    Follow Up In [facility] - Pharmacy    Primary hypertension    Relevant Medications    hydroCHLOROthiazide (Microzide) 12.5 mg tablet     Other Visit Diagnoses       Food insecurity        Relevant Orders    Referral to [facility]    Edema, unspecified type        Relevant Medications    hydroCHLOROthiazide (Microzide) 12.5 mg tablet    Colon cancer screening        Relevant Orders    Colonoscopy Screening; Average Risk Patient    Encounter for screening mammogram for malignant neoplasm of breast        Relevant Orders    BI mammo bilateral screening tomosynthesis            Pt was seen and discussed with Dr. Dana Sorensen MD

## 2024-07-15 NOTE — PATIENT INSTRUCTIONS
Thank you for coming today Meme!    Please make an appointment with [facility].     Please start hydrochlorothiazide 12.5mg daily for blood pressure and swelling.    Please start ozempic 0.25mg weekly.     The pharmacist here will be calling to check in.     Please schedule your mammogram. You can get this done on the lower level in [address]. The phone number is [phone].     Please schedule your colonoscopy by calling [phone].     I will see you in a few weeks!

## 2024-07-19 DIAGNOSIS — Z79.4 TYPE 2 DIABETES MELLITUS WITH OTHER CIRCULATORY COMPLICATION, WITH LONG-TERM CURRENT USE OF INSULIN (MULTI): ICD-10-CM

## 2024-07-19 DIAGNOSIS — E11.59 TYPE 2 DIABETES MELLITUS WITH OTHER CIRCULATORY COMPLICATION, WITH LONG-TERM CURRENT USE OF INSULIN (MULTI): ICD-10-CM

## 2024-07-29 ENCOUNTER — TELEPHONE (OUTPATIENT)
Dept: PRIMARY CARE | Facility: CLINIC | Age: 59
End: 2024-07-29

## 2024-08-01 ENCOUNTER — HOSPITAL ENCOUNTER (OUTPATIENT)
Dept: RADIOLOGY | Facility: CLINIC | Age: 59
End: 2024-08-01
Payer: COMMERCIAL

## 2024-08-01 ENCOUNTER — APPOINTMENT (OUTPATIENT)
Dept: NEUROLOGY | Facility: CLINIC | Age: 59
End: 2024-08-01
Payer: COMMERCIAL

## 2024-08-08 ENCOUNTER — APPOINTMENT (OUTPATIENT)
Dept: NEUROLOGY | Facility: CLINIC | Age: 59
End: 2024-08-08
Payer: COMMERCIAL

## 2024-08-12 DIAGNOSIS — R60.9 EDEMA, UNSPECIFIED TYPE: ICD-10-CM

## 2024-08-12 DIAGNOSIS — I10 PRIMARY HYPERTENSION: ICD-10-CM

## 2024-08-13 RX ORDER — HYDROCHLOROTHIAZIDE 12.5 MG/1
12.5 TABLET ORAL DAILY
Qty: 30 TABLET | Refills: 0 | Status: SHIPPED | OUTPATIENT
Start: 2024-08-13

## 2024-08-22 ENCOUNTER — CLINICAL SUPPORT (OUTPATIENT)
Dept: NUTRITION | Facility: HOSPITAL | Age: 59
End: 2024-08-22
Payer: COMMERCIAL

## 2024-08-22 ENCOUNTER — TELEPHONE (OUTPATIENT)
Dept: PRIMARY CARE | Facility: CLINIC | Age: 59
End: 2024-08-22

## 2024-08-22 NOTE — PROGRESS NOTES
[facility]  Diet Recommendation 1: Diabetes  Diet Recommendation 2: Heart Healthy  Diet Recommendation 3: Healthy Eating  Food Intolerance Avoidance: NKFA  Nutrition Goals Stated: Get blood sugars under control and eat healthier in general (more veg)  Household Size: 5 Members (4 Max/Household)  Interventions: Referral Number: 1st 6 Mo Referral 6 Mos  Interventions: Visit Number: 2 of 6 Visits - Max 6 Visits/Referral Each 6 Mo Period  Education Today: [employer] Me[employer]  Follow Up Notes for Future Visits: Pt stated blood glucose management has not not been great lately. Seeking ozempic. Wants to lost 60#.  Grains: Whole - 100%  Fruit: % Fresh  Vegetables: % Fresh  Proteins: 4 or more Plant-based Items  Dairy: 25-50% Lowfat  Originating Site of Referral Order: [facility] [address]  Initials of RD Assisting Today: VENITA

## 2024-09-02 DIAGNOSIS — I63.9 CEREBROVASCULAR ACCIDENT (CVA), UNSPECIFIED MECHANISM (MULTI): ICD-10-CM

## 2024-09-04 RX ORDER — CLOPIDOGREL BISULFATE 75 MG/1
75 TABLET ORAL DAILY
Qty: 90 TABLET | Refills: 3 | Status: SHIPPED | OUTPATIENT
Start: 2024-09-04

## 2024-09-14 DIAGNOSIS — R60.9 EDEMA, UNSPECIFIED TYPE: ICD-10-CM

## 2024-09-14 DIAGNOSIS — I10 PRIMARY HYPERTENSION: ICD-10-CM

## 2024-09-15 RX ORDER — HYDROCHLOROTHIAZIDE 12.5 MG/1
12.5 TABLET ORAL DAILY
Qty: 30 TABLET | Refills: 0 | Status: SHIPPED | OUTPATIENT
Start: 2024-09-15

## 2024-09-17 ENCOUNTER — APPOINTMENT (OUTPATIENT)
Dept: PRIMARY CARE | Facility: CLINIC | Age: 59
End: 2024-09-17
Payer: COMMERCIAL

## 2024-10-07 ENCOUNTER — APPOINTMENT (OUTPATIENT)
Dept: PHARMACY | Facility: HOSPITAL | Age: 59
End: 2024-10-07
Payer: COMMERCIAL

## 2024-10-21 ENCOUNTER — APPOINTMENT (OUTPATIENT)
Dept: PHARMACY | Facility: HOSPITAL | Age: 59
End: 2024-10-21
Payer: COMMERCIAL

## 2024-10-21 DIAGNOSIS — Z79.4 TYPE 2 DIABETES MELLITUS WITH OTHER CIRCULATORY COMPLICATION, WITH LONG-TERM CURRENT USE OF INSULIN: ICD-10-CM

## 2024-10-21 DIAGNOSIS — E11.59 TYPE 2 DIABETES MELLITUS WITH OTHER CIRCULATORY COMPLICATION, WITH LONG-TERM CURRENT USE OF INSULIN: ICD-10-CM

## 2024-10-21 RX ORDER — HYDROCHLOROTHIAZIDE 12.5 MG/1
CAPSULE ORAL
Qty: 2 EACH | Refills: 3 | Status: SHIPPED | OUTPATIENT
Start: 2024-10-21

## 2024-10-21 RX ORDER — ASPIRIN 81 MG/1
81 TABLET ORAL DAILY
COMMUNITY
End: 2024-10-21 | Stop reason: SDUPTHER

## 2024-10-21 RX ORDER — DULAGLUTIDE 0.75 MG/.5ML
0.75 INJECTION, SOLUTION SUBCUTANEOUS WEEKLY
Qty: 2 ML | Refills: 1 | Status: SHIPPED | OUTPATIENT
Start: 2024-10-21

## 2024-10-21 ASSESSMENT — ENCOUNTER SYMPTOMS
FATIGUE: 1
BLURRED VISION: 1

## 2024-10-21 NOTE — Clinical Note
Patient states she was told by [facility] to stop her hydrochlorothiazide until seen by you but says she's been unable to schedule an appointment. Sounds like she's still having low BP, do you want her to stop or continue at this time. She is also requesting refill on statin and is requesting an Rx for ASA 81 mg daily.

## 2024-10-21 NOTE — ASSESSMENT & PLAN NOTE
Patient's goal A1c is < 7%.  Is pt at goal? No, 12.8%, 9/30  Patient's SMBGs are 279-300 mg/dL, no logs have been kept.     Rationale for plan: Patient is not well controlled, has not started Ozempic due to prior authorization needed. Patient requires additional treatment to control diabetes GLP-1 receptor antagonist recommended as well as lifestyle modifications of diet and exercise. Previously, insulin glargine was reduced from 30 units to 14 units. Patient previously on metformin 1000mg but caused GI upset with diarrhea, patient intolerant to medication.    Medication Changes:  CONTINUE:  Insulin glargine 14 units given 1 dose subcutaneously in the morning  Insulin lispro 8 units given subcutaneously before meals 3 times daily  START  Trulicity 0.75mg given 1 dose subcutaneously once per week  FreeStyle Carlee 3 Plus Continuous glucose monitor, change sensor once every 15 days     Monitoring and Education:  Patient is aware of side effects of Trulicity to include nausea, upset stomach, diarrhea or constipation  Patient informed on recommended diet to be of healthy foods with vegetables and less sugary carbohydrates  Recommended 150 minutes of exercise, patient interested in walking, gave suggestion of chair yoga if difficulty walking

## 2024-10-21 NOTE — PROGRESS NOTES
Clinical Pharmacy Appointment  Subjective     Patient ID: Whiteny Alexander is a 59 y.o. female who presents for Diabetes.     Referring Provider: Radha Cortez MD M*     Diabetes  She presents for her initial diabetic visit. She has type 2 diabetes mellitus. Her disease course has been improving. Associated symptoms include blurred vision, fatigue and foot paresthesias. Risk factors for coronary artery disease include obesity, tobacco exposure and post-menopausal. Current diabetic treatment includes insulin injections. She is compliant with treatment some of the time. She is currently taking insulin pre-breakfast, pre-lunch and pre-dinner. When asked about meal planning, she reported none. She has not had a previous visit with a dietitian. She rarely participates in exercise. An ACE inhibitor/angiotensin II receptor blocker is not being taken. She does not see a podiatrist.Eye exam is not current.     Previous oral antidiabetes medications:  Metformin 1000mg once daily-GI side effects extensive bathroom use     Diet: Patient admits to not having a set diet, she sometimes skips meals and does intermittent fasting. Patient had soda recently which might have led to UTI. Interested in seeing nutritionist to help with dieting.    Exercise: Patient does enjoys walking but has difficulty after stroke.    Allergies   Allergen Reactions    Metformin Diarrhea       Objective     Current DM Pharmacotherapy:   Lantus Pens (insulin glargine) 14 units in the morning   HumaLOG KwikPen (insulin lispro) 8 units TID  Ozempic (semaglutide) 0.25mg once weekly    Clarifications to above regimen:   Did not  Ozempic 0.25mg, prior authorization required    Adverse Effects: none    SECONDARY PREVENTION  - Statin? Yes, rosuvastatin 40mg, ezetimbe 10mg   - ACEi/ARB? No, previously on losartan-hydrochlorothiazide but BP readings were lower, taking hydrochlorothiazide 12.5mg  - Aspirin? Yes    Current monitoring regimen:  "  Patient is using: glucometer    Testing frequency: testing 3 to 4 times daily with OneTouch    SMBG Readings: around ~279-300 mg/dL, logs not kept    Any episodes of hypoglycemia? No.  Did patient treat episode of hypoglycemia appropriately? N/A    Pertinent PMH Review:  - PMH of Pancreatitis: No  - PMH/FH of Medullary Thyroid Cancer: No  - PMH/FH of Multiple Endocrine Neoplasia (MEN) Type II: No  - PMH of Retinopathy: No  - PMH of Urinary Tract Infections/Yeast Infections: Yes, has UTI currently saw provider for antibiotic. Every 3-4 months, might have been from drinking soda.     Lab Review  BMP  Lab Results   Component Value Date    CALCIUM 10.1 12/11/2023     (L) 12/11/2023    K 3.8 12/11/2023    CO2 27 12/11/2023    CL 96 (L) 12/11/2023    BUN 17 12/11/2023    CREATININE 0.99 12/11/2023    EGFR 65 12/11/2023   Outside Labs   eGFR 68 10/1/2024    LFTs  Lab Results   Component Value Date    ALT 10 12/11/2023    AST 10 12/11/2023    ALKPHOS 79 12/11/2023    BILITOT 1.1 12/11/2023     FLP  Lab Results   Component Value Date    TRIG 134 12/11/2023    CHOL 158 12/11/2023    LDLCALC 83 12/11/2023    HDL 48.6 12/11/2023       The ASCVD Risk score (Guero NOGUEIAR, et al., 2019) failed to calculate for the following reasons:    Risk score cannot be calculated because patient has a medical history suggesting prior/existing ASCVD  Urine Microalbumin  No results found for: \"MICROALBCREA\"  Weight Management  Wt Readings from Last 3 Encounters:   07/15/24 110 kg (243 lb)   09/26/23 115 kg (254 lb)      There is no height or weight on file to calculate BMI.   A1C  Lab Results   Component Value Date    HGBA1C 12.8 (H) 09/30/2024    HGBA1C 12.8 (H) 06/26/2024    HGBA1C 14.0 (H) 04/30/2024         Assessment/Plan     Problem List Items Addressed This Visit       Diabetes mellitus (Multi)     Patient's goal A1c is < 7%.  Is pt at goal? No, 12.8%, 9/30  Patient's SMBGs are 279-300 mg/dL, no logs have been kept.     Rationale for " plan: Patient is not well controlled, has not started Ozempic due to prior authorization needed. Patient requires additional treatment to control diabetes GLP-1 receptor antagonist recommended as well as lifestyle modifications of diet and exercise. Previously, insulin glargine was reduced from 30 units to 14 units.    Medication Changes:  CONTINUE:  Insulin glargine 14 units given 1 dose subcutaneously in the morning  Insulin lispro 8 units given subcutaneously before meals 3 times daily  START  Trulicity 0.75mg given 1 dose subcutaneously once per week  FreeStyle Carlee 3 Plus Continuous glucose monitor, change sensor once every 15 days     Monitoring and Education:  Patient is aware of side effects of Trulicity to include nausea, upset stomach, diarrhea or constipation  Patient informed on recommended diet to be of healthy foods with vegetables and less sugary carbohydrates  Recommended 150 minutes of exercise, patient interested in walking, gave suggestion of chair yoga if difficulty walking            Was seen at [facility] and medications were to be held until seeing PCP, unable to schedule. Patient wants to know if they should continue taking Hydrochlorothiazide 12.5mg, and if so could she get a refill. Requesting prescriptions for rosuvastatin 40mg once daily and aspirin 81mg once daily.    Type 2 diabetes mellitus, is not at goal. Goal A1C: <7%    Follow up: I recommend diabetes care be 4 weeks.  Scheduled for 11/18 at 1:00pm  Ensure patient picked up medications and tolerating  Referral to nutritionist    Mabel SotoD [facility]  Pharmacy Resident PGY1    Continue all meds under the continuation of care with the referring provider and clinical pharmacy team

## 2024-11-19 ENCOUNTER — APPOINTMENT (OUTPATIENT)
Dept: OPHTHALMOLOGY | Facility: CLINIC | Age: 59
End: 2024-11-19
Payer: COMMERCIAL